# Patient Record
Sex: MALE | Race: WHITE
[De-identification: names, ages, dates, MRNs, and addresses within clinical notes are randomized per-mention and may not be internally consistent; named-entity substitution may affect disease eponyms.]

---

## 2020-03-20 ENCOUNTER — HOSPITAL ENCOUNTER (EMERGENCY)
Dept: HOSPITAL 25 - UCEAST | Age: 25
Discharge: HOME | End: 2020-03-20
Payer: COMMERCIAL

## 2020-03-20 VITALS — DIASTOLIC BLOOD PRESSURE: 67 MMHG | SYSTOLIC BLOOD PRESSURE: 109 MMHG

## 2020-03-20 DIAGNOSIS — Z20.828: ICD-10-CM

## 2020-03-20 DIAGNOSIS — J06.9: Primary | ICD-10-CM

## 2020-03-20 DIAGNOSIS — J02.9: ICD-10-CM

## 2020-03-20 PROCEDURE — 99203 OFFICE O/P NEW LOW 30 MIN: CPT

## 2020-03-20 PROCEDURE — 87651 STREP A DNA AMP PROBE: CPT

## 2020-03-20 PROCEDURE — G0463 HOSPITAL OUTPT CLINIC VISIT: HCPCS

## 2020-03-20 PROCEDURE — U0002 COVID-19 LAB TEST NON-CDC: HCPCS

## 2020-03-20 NOTE — UC
Respiratory Complaint HPI





- HPI Summary


HPI Summary: 


24-year-old male comes in with a chief complaint of sore throat and swollen 

neck lymph nodes for one day.  Also has dry cough.  No shortness of breath no 

fevers no bodyaches.  No known contact with anybody with Covid 19 however he 

has been working at [employer].





- History of Current Complaint


Chief Complaint: UCGeneralIllness


Stated Complaint: COUGH SORE THROAT


Time Seen by Provider: 03/20/20 08:22


Pain Intensity: 0





- Allergies/Home Medications


Allergies/Adverse Reactions: 


 Allergies











Allergy/AdvReac Type Severity Reaction Status Date / Time


 


No Known Allergies Allergy   Verified 03/20/20 07:57











Home Medications: 


 Home Medications





Amoxicillin PO (*) [Amoxicillin 875 MG (*)] 875 mg PO BID #20 tab 03/20/20 [Rx]











PMH/Surg Hx/FS Hx/Imm Hx


Previously Healthy: Yes





- Surgical History


Surgical History: Yes


Surgery Procedure, Year, and Place: kidney stones removed





- Family History


Known Family History: 


   Negative: Hypertension, Diabetes





- Social History


Alcohol Use: Occasionally


Substance Use Type: Marijuana


Substance Use Comment - Amount & Last Used: ocassionally


Smoking Status (MU): Never Smoked Tobacco





Review of Systems


All Other Systems Reviewed And Are Negative: Yes


Constitutional: Positive: Other - SEE HPI


Skin: Positive: Negative


Eyes: Positive: Negative


ENT: Positive: Sore Throat


Respiratory: Positive: Cough


Cardiovascular: Positive: Negative


Gastrointestinal: Positive: Negative


Motor: Positive: Negative


Neurovascular: Positive: Negative


Musculoskeletal: Positive: Negative


Neurological/Mental Status: Positive: Negative


Psychological: Positive: Negative


Is Patient Immunocompromised?: No





Physical Exam


Triage Information Reviewed: Yes


Appearance: Well-Appearing, No Pain Distress, Well-Nourished


Vital Signs: 


 Initial Vital Signs











Temp  0 F   03/20/20 07:58


 


Pulse  0   03/20/20 07:58


 


Resp  0   03/20/20 07:58


 


BP  0/0   03/20/20 07:58


 


Pulse Ox  0   03/20/20 07:58











Vital Signs Reviewed: Yes


Eyes: Positive: Conjunctiva Clear


ENT: Negative: Nasal drainage


Neck: Positive: Supple


Respiratory: Positive: No respiratory distress


Musculoskeletal: Positive: ROM Intact


Neurological: Positive: Alert


Psychological: Positive: Age Appropriate Behavior





Respiratory Course/Dx





- Course


Course Of Treatment: 





Strep and flu are negative.  Patient has no fever.  Does have a dry cough.  

Tested for Covid 19.  Patient to be self-isolation.  [facility] 

Department will contact the patient.  Patient's to get reevaluated if worse or 

any questions or concerns.





- Differential Dx/Diagnosis


Provider Diagnosis: 


 Upper respiratory infection, Pharyngitis








Discharge ED





- Sign-Out/Discharge


Documenting (check all that apply): Patient Departure


All imaging exams completed and their final reports reviewed: No Studies





- Discharge Plan


Condition: Stable


Disposition: HOME


Prescriptions: 


Amoxicillin PO (*) [Amoxicillin 875 MG (*)] 875 mg PO BID #20 tab


Patient Education Materials:  Pharyngitis (ED), Upper Respiratory Infection (ED)


Forms:  *Work Release


Referrals: 


Radha Valdez DO [Primary Care Provider] - 


[facility] Dept [Outside]


Additional Instructions: 


PLACE YOURSELF IN HOME ISOLATION.


THE [facility] DEPARTMENT WILL CONTACT YOU.


CONTACT THEM TOMORROW IF YOU HAVE NOT HEARD FROM THEM.


FOLLOW UP WITH YOUR DOCTOR IF NOT COMPLETELY IMPROVED.


GO TO THE EMERGENCY DEPARTMENT IF NOT IMPROVED OR WORSE OR ANY QUESTIONS OR 

CONCERNS.





- Billing Disposition and Condition


Condition: STABLE


Disposition: Home